# Patient Record
Sex: MALE | Race: WHITE
[De-identification: names, ages, dates, MRNs, and addresses within clinical notes are randomized per-mention and may not be internally consistent; named-entity substitution may affect disease eponyms.]

---

## 2019-07-10 ENCOUNTER — HOSPITAL ENCOUNTER (OUTPATIENT)
Dept: HOSPITAL 61 - KCIC | Age: 64
Discharge: HOME | End: 2019-07-10
Attending: UROLOGY
Payer: MEDICARE

## 2019-07-10 DIAGNOSIS — M51.36: ICD-10-CM

## 2019-07-10 DIAGNOSIS — M47.816: ICD-10-CM

## 2019-07-10 DIAGNOSIS — N20.0: Primary | ICD-10-CM

## 2019-07-10 PROCEDURE — 74018 RADEX ABDOMEN 1 VIEW: CPT

## 2019-07-10 NOTE — [facility]
KUB

 

History: Renal calculus

 

Comparison: There is no previous similar exam available, CT available 

dated January 14, 2014 

Findings:

2 supine AP views of abdomen are submitted. There is a calculus in the 

left pelvic region. Renal shadows are poorly evaluated due to overlying 

bowel gas and stool. There is surgical suture in the right lower quadrant 

of the abdomen. There is likely cholelithiasis. There is mild lumbar 

dextroscoliosis. There is spondylosis and degenerative disc disease of the

inferior lumbar spine.

 

Impression: 

 

1.  Calculus in the left pelvis could be a phlebolith although distal left

ureteral calculus not excluded. Renal shadows are poorly evaluated due to 

bowel gas and bowel. There is likely cholelithiasis.

 

Electronically signed by: Dung Ulloa MD (7/10/2019 5:17 PM) 

[id number]